# Patient Record
Sex: MALE | Race: BLACK OR AFRICAN AMERICAN | NOT HISPANIC OR LATINO
[De-identification: names, ages, dates, MRNs, and addresses within clinical notes are randomized per-mention and may not be internally consistent; named-entity substitution may affect disease eponyms.]

---

## 2019-10-29 ENCOUNTER — RESULT REVIEW (OUTPATIENT)
Age: 44
End: 2019-10-29

## 2019-10-30 ENCOUNTER — RESULT REVIEW (OUTPATIENT)
Age: 44
End: 2019-10-30

## 2019-10-30 ENCOUNTER — APPOINTMENT (OUTPATIENT)
Dept: GASTROENTEROLOGY | Facility: HOSPITAL | Age: 44
End: 2019-10-30

## 2019-10-30 PROBLEM — Z00.00 ENCOUNTER FOR PREVENTIVE HEALTH EXAMINATION: Status: ACTIVE | Noted: 2019-10-30

## 2019-11-12 ENCOUNTER — APPOINTMENT (OUTPATIENT)
Dept: SURGERY | Facility: CLINIC | Age: 44
End: 2019-11-12
Payer: MEDICAID

## 2019-11-12 VITALS
HEIGHT: 73 IN | DIASTOLIC BLOOD PRESSURE: 86 MMHG | SYSTOLIC BLOOD PRESSURE: 152 MMHG | HEART RATE: 101 BPM | BODY MASS INDEX: 35.59 KG/M2 | RESPIRATION RATE: 18 BRPM | OXYGEN SATURATION: 98 % | WEIGHT: 268.5 LBS

## 2019-11-12 DIAGNOSIS — Z86.39 PERSONAL HISTORY OF OTHER ENDOCRINE, NUTRITIONAL AND METABOLIC DISEASE: ICD-10-CM

## 2019-11-12 DIAGNOSIS — K85.10 BILIARY ACUTE PANCREATITIS WITHOUT NECROSIS OR INFECTION: ICD-10-CM

## 2019-11-12 DIAGNOSIS — Z78.9 OTHER SPECIFIED HEALTH STATUS: ICD-10-CM

## 2019-11-12 PROCEDURE — 99024 POSTOP FOLLOW-UP VISIT: CPT

## 2019-11-12 RX ORDER — GLIPIZIDE 2.5 MG/1
2.5 TABLET, FILM COATED, EXTENDED RELEASE ORAL
Refills: 0 | Status: ACTIVE | COMMUNITY

## 2019-11-12 RX ORDER — METFORMIN HYDROCHLORIDE 1000 MG/1
1000 TABLET, COATED ORAL
Refills: 0 | Status: ACTIVE | COMMUNITY

## 2019-11-12 RX ORDER — LISINOPRIL 20 MG/1
20 TABLET ORAL
Refills: 0 | Status: ACTIVE | COMMUNITY

## 2019-11-12 NOTE — PHYSICAL EXAM
[Calm] : calm [JVD] : no jugular venous distention  [de-identified] : MERCY, nonicteric [de-identified] : WN, WD, in NAD [de-identified] : Soft, nontender, nondistended\par Incisions healing well, no erythema, mild ecchymosis around the umbilical incision

## 2019-11-12 NOTE — HISTORY OF PRESENT ILLNESS
[de-identified] : 44-year-old male status post laparoscopic cholecystectomy here for follow up.  Patient was originally admitted for biliary pancreatitis and had surgery performed while an inpatient.Patient is doing well.  Reported some mild residual right upper quadrant pain improving daily and no longer requiring pain medication.  Denies fever, chills, diarrhea or constipation.  Tolerating regular diet.  Reports good blood sugar control.

## 2019-11-12 NOTE — PLAN
[FreeTextEntry1] : 44-year-old male status post laparoscopic cholecystectomy\par Advance diet as tolerated\par No heavy lifting for 2-3 weeks\par Followup as needed

## 2020-01-06 ENCOUNTER — APPOINTMENT (OUTPATIENT)
Dept: GASTROENTEROLOGY | Facility: CLINIC | Age: 45
End: 2020-01-06
Payer: MEDICAID

## 2020-01-06 VITALS
HEIGHT: 73 IN | BODY MASS INDEX: 35.52 KG/M2 | DIASTOLIC BLOOD PRESSURE: 80 MMHG | WEIGHT: 268 LBS | SYSTOLIC BLOOD PRESSURE: 130 MMHG | HEART RATE: 104 BPM

## 2020-01-06 DIAGNOSIS — D36.9 BENIGN NEOPLASM, UNSPECIFIED SITE: ICD-10-CM

## 2020-01-06 PROCEDURE — 99213 OFFICE O/P EST LOW 20 MIN: CPT

## 2020-01-06 PROCEDURE — 99203 OFFICE O/P NEW LOW 30 MIN: CPT

## 2020-01-06 NOTE — ASSESSMENT
[FreeTextEntry1] : Will need a repeat EGD for abnormal papillary tissue, will need major papilla biopsies.  Explained risks/benefits/alternatives including not limited to bleeding, infection, perforation, missed lesions, anesthesia complications.  Patient understands and agrees, all questions answered.\par \par Procedure is non-urgent, however would perform within the next few months.  He will call to schedule.

## 2020-01-06 NOTE — PHYSICAL EXAM
[General Appearance - In No Acute Distress] : in no acute distress [General Appearance - Alert] : alert [Outer Ear] : the ears and nose were normal in appearance [Sclera] : the sclera and conjunctiva were normal [Neck Appearance] : the appearance of the neck was normal [] : no respiratory distress [Apical Impulse] : the apical impulse was normal [Abdomen Soft] : soft [Abdomen Tenderness] : non-tender [Abnormal Walk] : normal gait [Skin Color & Pigmentation] : normal skin color and pigmentation [Cranial Nerves] : cranial nerves 2-12 were intact [Oriented To Time, Place, And Person] : oriented to person, place, and time

## 2020-01-06 NOTE — HISTORY OF PRESENT ILLNESS
[FreeTextEntry1] : Mr. Umaña is a pleasant 44M h/o DM who returns s/p hospitalization from 10/29 - 11/1/19 for choledocholithiasis.  He was initially hospitalized for abdominal pain.\par \par Abd U/S on 10/29:\par IMPRESSION: Cholelithiasis. Adenomyomatosis\par Echogenic liver most consistent with hepatic steatosis\par \par MRCP on 10/29:\par 1. Cholelithiasis is noted, but there is no definite MRI evidence of acute \par cholecystitis seen at this time. Multiple tiny (less than 5 mm) nonobstructing \par stones are seen in the distal CBD, compatible with choledocholithiasis. No \par significant abnormal biliary dilation is seen.\par \par Underwent an ERCP on 10/30/19 showing:\par Impression:\par 1. Choledocholithiasis s/p ERCP with sphincterotomy, stone removal\par 2. Cholelithiasis\par 3. Mildly abnormal papillary mucosa s/p biopsy\par 4. Duodenal nodule s/p biopsy\par \par Pathology showed mild atypia for papillary biopsies (see pathology report for full details).\par \par Underwent a cholecystectomy on 11/1/19, uncomplicated.\par \par Feeling well today, no complaints.  Comes to review results and discuss further plan.

## 2020-06-27 ENCOUNTER — RESULT REVIEW (OUTPATIENT)
Age: 45
End: 2020-06-27

## 2020-06-28 ENCOUNTER — RESULT REVIEW (OUTPATIENT)
Age: 45
End: 2020-06-28

## 2020-06-29 ENCOUNTER — APPOINTMENT (OUTPATIENT)
Dept: GASTROENTEROLOGY | Facility: HOSPITAL | Age: 45
End: 2020-06-29